# Patient Record
Sex: MALE | Race: WHITE | NOT HISPANIC OR LATINO
[De-identification: names, ages, dates, MRNs, and addresses within clinical notes are randomized per-mention and may not be internally consistent; named-entity substitution may affect disease eponyms.]

---

## 2018-06-22 PROBLEM — Z00.00 ENCOUNTER FOR PREVENTIVE HEALTH EXAMINATION: Status: ACTIVE | Noted: 2018-06-22

## 2018-06-25 ENCOUNTER — APPOINTMENT (OUTPATIENT)
Dept: OTOLARYNGOLOGY | Facility: CLINIC | Age: 58
End: 2018-06-25
Payer: COMMERCIAL

## 2018-06-25 ENCOUNTER — APPOINTMENT (OUTPATIENT)
Dept: OTOLARYNGOLOGY | Facility: CLINIC | Age: 58
End: 2018-06-25

## 2018-06-25 VITALS
SYSTOLIC BLOOD PRESSURE: 158 MMHG | OXYGEN SATURATION: 96 % | WEIGHT: 200 LBS | HEIGHT: 72 IN | DIASTOLIC BLOOD PRESSURE: 96 MMHG | BODY MASS INDEX: 27.09 KG/M2 | TEMPERATURE: 99.2 F | HEART RATE: 76 BPM

## 2018-06-25 DIAGNOSIS — Z78.9 OTHER SPECIFIED HEALTH STATUS: ICD-10-CM

## 2018-06-25 DIAGNOSIS — C10.9 MALIGNANT NEOPLASM OF OROPHARYNX, UNSPECIFIED: ICD-10-CM

## 2018-06-25 DIAGNOSIS — Z80.9 FAMILY HISTORY OF MALIGNANT NEOPLASM, UNSPECIFIED: ICD-10-CM

## 2018-06-25 DIAGNOSIS — C77.9 SECONDARY AND UNSPECIFIED MALIGNANT NEOPLASM OF LYMPH NODE, UNSPECIFIED: ICD-10-CM

## 2018-06-25 PROCEDURE — 31575 DIAGNOSTIC LARYNGOSCOPY: CPT

## 2018-06-25 PROCEDURE — 99204 OFFICE O/P NEW MOD 45 MIN: CPT | Mod: 25

## 2018-06-29 PROBLEM — C77.9 METASTATIC SQUAMOUS CELL CARCINOMA TO LYMPH NODE: Status: ACTIVE | Noted: 2018-06-29

## 2018-06-29 PROBLEM — Z80.9 FAMILY HISTORY OF MALIGNANT NEOPLASM: Status: ACTIVE | Noted: 2018-06-25

## 2018-06-29 PROBLEM — C10.9 SQUAMOUS CELL CARCINOMA OF OROPHARYNX: Status: ACTIVE | Noted: 2018-06-29

## 2018-06-29 PROBLEM — Z78.9 NON-SMOKER: Status: ACTIVE | Noted: 2018-06-25

## 2018-06-29 PROBLEM — Z78.9 SOCIAL ALCOHOL USE: Status: ACTIVE | Noted: 2018-06-25

## 2018-06-29 PROBLEM — Z78.9 CAFFEINE USE: Status: ACTIVE | Noted: 2018-06-25

## 2022-05-17 ENCOUNTER — NON-APPOINTMENT (OUTPATIENT)
Age: 62
End: 2022-05-17

## 2022-05-17 ENCOUNTER — APPOINTMENT (OUTPATIENT)
Dept: UROLOGY | Facility: CLINIC | Age: 62
End: 2022-05-17
Payer: COMMERCIAL

## 2022-05-17 VITALS
HEIGHT: 72 IN | RESPIRATION RATE: 18 BRPM | HEART RATE: 92 BPM | BODY MASS INDEX: 27.09 KG/M2 | SYSTOLIC BLOOD PRESSURE: 150 MMHG | WEIGHT: 200 LBS | DIASTOLIC BLOOD PRESSURE: 88 MMHG | TEMPERATURE: 98.3 F

## 2022-05-17 VITALS — SYSTOLIC BLOOD PRESSURE: 132 MMHG | DIASTOLIC BLOOD PRESSURE: 72 MMHG

## 2022-05-17 DIAGNOSIS — Z80.42 FAMILY HISTORY OF MALIGNANT NEOPLASM OF PROSTATE: ICD-10-CM

## 2022-05-17 LAB
BILIRUB UR QL STRIP: NORMAL
CLARITY UR: CLEAR
COLLECTION METHOD: NORMAL
GLUCOSE UR-MCNC: NORMAL
HCG UR QL: 0.2 EU/DL
HGB UR QL STRIP.AUTO: NORMAL
KETONES UR-MCNC: NORMAL
LEUKOCYTE ESTERASE UR QL STRIP: NORMAL
NITRITE UR QL STRIP: NORMAL
PH UR STRIP: 5.5
PROT UR STRIP-MCNC: NORMAL
SP GR UR STRIP: 1.01

## 2022-05-17 PROCEDURE — 81003 URINALYSIS AUTO W/O SCOPE: CPT | Mod: QW

## 2022-05-17 PROCEDURE — 51798 US URINE CAPACITY MEASURE: CPT

## 2022-05-17 PROCEDURE — 99204 OFFICE O/P NEW MOD 45 MIN: CPT

## 2022-05-17 RX ORDER — LOSARTAN POTASSIUM 100 MG/1
TABLET, FILM COATED ORAL
Refills: 0 | Status: DISCONTINUED | COMMUNITY
End: 2022-05-17

## 2022-05-17 NOTE — HISTORY OF PRESENT ILLNESS
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 He is unsure of the PSA value. No prior prostate biopsies or prostate MRIs\par \par His throat cancer is in remission.\par \par He has minimal urinary symptoms. IPSS 4, QOL 0, PVR 90\par \par He believes father had prostate cancer and grandfather as well. Both lived into 90s. No family history of breast, ovarian, colon or other cancers.

## 2022-05-17 NOTE — PHYSICAL EXAM
[General Appearance - Well Developed] : well developed [General Appearance - Well Nourished] : well nourished [Normal Appearance] : normal appearance [Well Groomed] : well groomed [General Appearance - In No Acute Distress] : no acute distress [Edema] : no peripheral edema [Respiration, Rhythm And Depth] : normal respiratory rhythm and effort [Exaggerated Use Of Accessory Muscles For Inspiration] : no accessory muscle use [Abdomen Soft] : soft [Costovertebral Angle Tenderness] : no ~M costovertebral angle tenderness [Abdomen Tenderness] : non-tender [Urethral Meatus] : meatus normal [Urinary Bladder Findings] : the bladder was normal on palpation [Scrotum] : the scrotum was normal [Testes Mass (___cm)] : there were no testicular masses [No Prostate Nodules] : no prostate nodules [Normal Station and Gait] : the gait and station were normal for the patient's age [] : no rash [No Focal Deficits] : no focal deficits [Affect] : the affect was normal [Oriented To Time, Place, And Person] : oriented to person, place, and time [Mood] : the mood was normal [Not Anxious] : not anxious

## 2022-05-17 NOTE — LETTER BODY
[Dear  ___] : Dear  [unfilled], [Courtesy Letter:] : I had the pleasure of seeing your patient, [unfilled], in my office today. [Please see my note below.] : Please see my note below. [Consult Closing:] : Thank you very much for allowing me to participate in the care of this patient.  If you have any questions, please do not hesitate to contact me. [Sincerely,] : Sincerely, [FreeTextEntry3] : Patricia Rangel MD

## 2022-05-17 NOTE — ASSESSMENT
[FreeTextEntry1] : I spent this consultation discussing the implications of an elevated PSA including the fact that the PSA level may be affected by various factors including age, prostate size, ethnicity, use of medications, and concurrent prostatic inflammation. Serum PSA is generally proportional to the risk of prostate cancer but there is no specific PSA cut-off signifying prostate cancer. I have recommended repeat PSA and possible prostate MRI if PSA remains elevated.\par

## 2022-05-19 LAB
PSA FREE FLD-MCNC: 15 %
PSA FREE SERPL-MCNC: 1 NG/ML
PSA SERPL-MCNC: 6.82 NG/ML

## 2022-06-07 ENCOUNTER — RESULT REVIEW (OUTPATIENT)
Age: 62
End: 2022-06-07

## 2022-06-07 ENCOUNTER — OUTPATIENT (OUTPATIENT)
Dept: OUTPATIENT SERVICES | Facility: HOSPITAL | Age: 62
LOS: 1 days | End: 2022-06-07

## 2022-06-07 ENCOUNTER — APPOINTMENT (OUTPATIENT)
Dept: MRI IMAGING | Facility: CLINIC | Age: 62
End: 2022-06-07
Payer: COMMERCIAL

## 2022-06-07 PROCEDURE — 72197 MRI PELVIS W/O & W/DYE: CPT | Mod: 26

## 2022-06-10 ENCOUNTER — NON-APPOINTMENT (OUTPATIENT)
Age: 62
End: 2022-06-10

## 2022-06-20 ENCOUNTER — NON-APPOINTMENT (OUTPATIENT)
Age: 62
End: 2022-06-20

## 2023-01-10 ENCOUNTER — APPOINTMENT (OUTPATIENT)
Dept: UROLOGY | Facility: CLINIC | Age: 63
End: 2023-01-10
Payer: COMMERCIAL

## 2023-01-10 VITALS
TEMPERATURE: 98 F | HEART RATE: 100 BPM | OXYGEN SATURATION: 96 % | SYSTOLIC BLOOD PRESSURE: 127 MMHG | DIASTOLIC BLOOD PRESSURE: 85 MMHG

## 2023-01-10 PROCEDURE — 99214 OFFICE O/P EST MOD 30 MIN: CPT

## 2023-01-10 NOTE — HISTORY OF PRESENT ILLNESS
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 He is unsure of the PSA value. No prior prostate biopsies or prostate MRIs\par \par His throat cancer is in remission.\par \par He has minimal urinary symptoms. IPSS 4, QOL 0, PVR 90\par \par He believes father had prostate cancer and grandfather as well. Both lived into 90s. No family history of breast, ovarian, colon or other cancers. \par \par 1/10/23:\par Patient returns for followup. He has noticed gradual increase in nocturia that is more bothersome. Daytime frequency as well. He does drink water overnight due to dry mouth from prior cancer treatment, however, limiting fluid intake has not helped with nocturia. No fevers, chills, dysuria, hematuria. \par \par His prostate MRI on 5/17/22 was PIRADS-1 with 73 cc prostate., PSA density was 0.09 \par \par IPSS: 10, QOL: 4\par UA: negative

## 2023-01-10 NOTE — ASSESSMENT
[FreeTextEntry1] : Assessment: Mr. ROBERTO MENDENHALL  is a 62 year year old man with elevated PSA to 6.8 (negative prostate MRI in 6/2022), moderate LUTS, prostate volume of 73 cc, not yet taking medications. This is most likely due to bladder outlet obstruction secondary to his enlarged prostate, although it is difficult to assess the role of bladder dysfunction, contributing to his symptoms.\par \par Plan: I spent this consultation with Mr. MENDENHALL discussing the causes, sequelae, and management of an enlarged prostate. I explained that enlargement of the prostate is common among men and there is increasing prevalence among older men. The severity of symptoms of an enlarged prostate, however, can vary widely. These symptoms are often "obstructive," characterized by weak force of stream, straining, and hesitancy, due to the impediment to bladder emptying. Additionally, over time, the bladder itself may change becoming thick-walled, less compliant, overactive with a lower capacity resulting in the "irritative" symptoms of urinary frequency and urgency. In the long term, the bladder muscles can start to become weaker and lead to the inability to empty the bladder and the inability to urinate at all. \par \par Medical management with alpha-blockers and/or tadalafil, which facilitates bladder emptying, is the first-line therapy. Furthermore, 5-alpha reductase inhibitors to shrink the prostate and/or anticholinergics to relax the bladder may also be added. Indications for surgical intervention include urinary retention, urinary tract infection, evidence of uropathy and worsening renal function, bladder stones, and gross hematuria.\par  - Begin tadalafil 5 mg daily\par  - F/U in 3 months\par \par \par \par

## 2023-01-11 ENCOUNTER — NON-APPOINTMENT (OUTPATIENT)
Age: 63
End: 2023-01-11

## 2023-01-11 LAB — PSA SERPL-MCNC: 10 NG/ML

## 2023-04-04 ENCOUNTER — APPOINTMENT (OUTPATIENT)
Dept: UROLOGY | Facility: CLINIC | Age: 63
End: 2023-04-04
Payer: COMMERCIAL

## 2023-04-04 VITALS
RESPIRATION RATE: 78 BRPM | HEART RATE: 100 BPM | SYSTOLIC BLOOD PRESSURE: 125 MMHG | OXYGEN SATURATION: 97 % | DIASTOLIC BLOOD PRESSURE: 77 MMHG

## 2023-04-04 LAB
BILIRUB UR QL STRIP: NORMAL
CLARITY UR: CLEAR
COLLECTION METHOD: NORMAL
GLUCOSE UR-MCNC: NORMAL
HCG UR QL: 0.2 EU/DL
HGB UR QL STRIP.AUTO: NORMAL
KETONES UR-MCNC: NORMAL
LEUKOCYTE ESTERASE UR QL STRIP: NORMAL
NITRITE UR QL STRIP: NORMAL
PH UR STRIP: 6
PROT UR STRIP-MCNC: NORMAL
SP GR UR STRIP: 1.02

## 2023-04-04 PROCEDURE — 81003 URINALYSIS AUTO W/O SCOPE: CPT | Mod: QW

## 2023-04-04 PROCEDURE — 51798 US URINE CAPACITY MEASURE: CPT

## 2023-04-04 PROCEDURE — 99213 OFFICE O/P EST LOW 20 MIN: CPT

## 2023-04-04 NOTE — ASSESSMENT
[FreeTextEntry1] : Assessment: Mr. ROBERTO MENDENHALL is a 62 year year old man with elevated PSA to 6.8 (negative prostate MRI in 6/2022), moderate LUTS, prostate volume of 73 cc, improved with tadalafil. \par \par PSA has gone up to 10 on last check, will repeat today and determine need for further evaluation.\par  - Continue tadalafil \par  -F/U PSA

## 2023-04-04 NOTE — HISTORY OF PRESENT ILLNESS
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 He is unsure of the PSA value. No prior prostate biopsies or prostate MRIs\par \par His throat cancer is in remission.\par \par He has minimal urinary symptoms. IPSS 4, QOL 0, PVR 90\par \par He believes father had prostate cancer and grandfather as well. Both lived into 90s. No family history of breast, ovarian, colon or other cancers. \par \par 1/10/23:\par Patient returns for followup. He has noticed gradual increase in nocturia that is more bothersome. Daytime frequency as well. He does drink water overnight due to dry mouth from prior cancer treatment, however, limiting fluid intake has not helped with nocturia. No fevers, chills, dysuria, hematuria. \par \par His prostate MRI on 5/17/22 was PIRADS-1 with 73 cc prostate., PSA density was 0.09 \par \par IPSS: 10, QOL: 4\par UA: negative \par \par 4/4/23:\par Patient returns for follow-up. He is on tadalafil 5 mg daily. His urination has improved with less nocturia. \par His last PSA was 10.00 on 1/10/23. The plan is to repeat today. He had a negative MRI on 5/7/22. \par \par IPSS: 10\par UA: neg\par PVR: 56 ml

## 2023-04-05 ENCOUNTER — NON-APPOINTMENT (OUTPATIENT)
Age: 63
End: 2023-04-05

## 2023-04-05 LAB
PSA FREE FLD-MCNC: 14 %
PSA FREE SERPL-MCNC: 1.09 NG/ML
PSA SERPL-MCNC: 7.73 NG/ML

## 2023-07-12 ENCOUNTER — APPOINTMENT (OUTPATIENT)
Dept: UROLOGY | Facility: CLINIC | Age: 63
End: 2023-07-12

## 2023-07-19 ENCOUNTER — APPOINTMENT (OUTPATIENT)
Dept: UROLOGY | Facility: CLINIC | Age: 63
End: 2023-07-19

## 2023-11-15 ENCOUNTER — APPOINTMENT (OUTPATIENT)
Dept: UROLOGY | Facility: CLINIC | Age: 63
End: 2023-11-15
Payer: MEDICAID

## 2023-11-15 DIAGNOSIS — R39.9 UNSPECIFIED SYMPTOMS AND SIGNS INVOLVING THE GENITOURINARY SYSTEM: ICD-10-CM

## 2023-11-15 DIAGNOSIS — R97.20 ELEVATED PROSTATE, SPECIFIC ANTIGEN [PSA]: ICD-10-CM

## 2023-11-15 PROCEDURE — 51798 US URINE CAPACITY MEASURE: CPT

## 2023-11-15 PROCEDURE — 99213 OFFICE O/P EST LOW 20 MIN: CPT | Mod: 25

## 2023-11-16 ENCOUNTER — NON-APPOINTMENT (OUTPATIENT)
Age: 63
End: 2023-11-16

## 2023-11-16 LAB — PSA SERPL-MCNC: 7.93 NG/ML

## 2024-01-22 ENCOUNTER — RX RENEWAL (OUTPATIENT)
Age: 64
End: 2024-01-22

## 2024-04-22 ENCOUNTER — RX RENEWAL (OUTPATIENT)
Age: 64
End: 2024-04-22

## 2024-04-22 RX ORDER — TADALAFIL 5 MG/1
5 TABLET ORAL
Qty: 90 | Refills: 0 | Status: ACTIVE | COMMUNITY
Start: 2023-01-10 | End: 1900-01-01

## 2024-10-01 ENCOUNTER — NON-APPOINTMENT (OUTPATIENT)
Age: 64
End: 2024-10-01

## 2024-10-02 ENCOUNTER — TRANSCRIPTION ENCOUNTER (OUTPATIENT)
Age: 64
End: 2024-10-02

## 2024-10-02 ENCOUNTER — APPOINTMENT (OUTPATIENT)
Dept: UROLOGY | Facility: CLINIC | Age: 64
End: 2024-10-02
Payer: MEDICAID

## 2024-10-02 VITALS
HEIGHT: 72 IN | TEMPERATURE: 97.4 F | SYSTOLIC BLOOD PRESSURE: 174 MMHG | BODY MASS INDEX: 27.09 KG/M2 | WEIGHT: 200 LBS | HEART RATE: 81 BPM | DIASTOLIC BLOOD PRESSURE: 85 MMHG

## 2024-10-02 DIAGNOSIS — R39.9 UNSPECIFIED SYMPTOMS AND SIGNS INVOLVING THE GENITOURINARY SYSTEM: ICD-10-CM

## 2024-10-02 DIAGNOSIS — R97.20 ELEVATED PROSTATE, SPECIFIC ANTIGEN [PSA]: ICD-10-CM

## 2024-10-02 PROCEDURE — 99213 OFFICE O/P EST LOW 20 MIN: CPT

## 2024-10-02 PROCEDURE — G2211 COMPLEX E/M VISIT ADD ON: CPT | Mod: NC

## 2024-10-02 NOTE — HISTORY OF PRESENT ILLNESS
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 He is unsure of the PSA value. No prior prostate biopsies or prostate MRIs  His throat cancer is in remission.  He has minimal urinary symptoms. IPSS 4, QOL 0, PVR 90  He believes father had prostate cancer and grandfather as well. Both lived into 90s. No family history of breast, ovarian, colon or other cancers.  1/10/23: Patient returns for followup. He has noticed gradual increase in nocturia that is more bothersome. Daytime frequency as well. He does drink water overnight due to dry mouth from prior cancer treatment, however, limiting fluid intake has not helped with nocturia. No fevers, chills, dysuria, hematuria.  His prostate MRI on 5/17/22 was PIRADS-1 with 73 cc prostate., PSA density was 0.09  IPSS: 10, QOL: 4 UA: negative  4/4/23: Patient returns for follow-up. He is on tadalafil 5 mg daily. His urination has improved with less nocturia.  His last PSA was 10.00 on 1/10/23. The plan is to repeat today. He had a negative MRI on 5/7/22.  IPSS: 10 UA: neg PVR: 56 ml  11/15/23: Patient here for PSA check and LUTS. He is doing well, he still has frequency and nocturia, he does attribute it to drinking more fluids due to being dry from his prior radiation. He is not bothered enough by his symptoms to try new medication. He remains on tadalafil 5 mg daily. Last PSA was 7.73 in April 2023.   IPSS: 8, QOL: 1  PVR: 59 ml   PSA 11/15/23--7.93  10/2/24: Doing well. Taking tadalafil 5 mg daily. Urinary symptoms generally stable.   IPSS 8, QOL 2

## 2024-10-02 NOTE — ASSESSMENT
[FreeTextEntry1] : Assessment: Mr. ROBERTO MENDENHALL is a 63 year old man with elevated PSA (negative prostate MRI in 6/2022), moderate LUTS, prostate volume of 73 cc, improved with tadalafil. He will continue on tadalafil daily. Lat PSA 11/2023 7.93.  PSA was drawn today. He will follow up in 6-12 months.    - Continue tadalafil  -F/U PSA - RTC 6-12 months depending on PSA

## 2024-10-03 LAB
APPEARANCE: CLEAR
BACTERIA: NEGATIVE /HPF
BILIRUBIN URINE: NEGATIVE
BLOOD URINE: NEGATIVE
CAST: 0 /LPF
COLOR: YELLOW
EPITHELIAL CELLS: 0 /HPF
GLUCOSE QUALITATIVE U: NEGATIVE MG/DL
KETONES URINE: NEGATIVE MG/DL
LEUKOCYTE ESTERASE URINE: NEGATIVE
MICROSCOPIC-UA: NORMAL
NITRITE URINE: NEGATIVE
PH URINE: 6.5
PROTEIN URINE: NEGATIVE MG/DL
PSA SERPL-MCNC: 7.04 NG/ML
RED BLOOD CELLS URINE: 0 /HPF
SPECIFIC GRAVITY URINE: 1.01
UROBILINOGEN URINE: 0.2 MG/DL
WHITE BLOOD CELLS URINE: 0 /HPF

## 2024-10-04 LAB — BACTERIA UR CULT: NORMAL
